# Patient Record
(demographics unavailable — no encounter records)

---

## 2025-04-01 NOTE — HISTORY OF PRESENT ILLNESS
[de-identified] : constipation x 7 days, h/a, stomach pain  [FreeTextEntry6] : Complaining of abdominal pain x 8 days. Has history of constipation, has been getting Miralax x 5 days. Has been doing 1 - 1.5 cap once per day in 24oz of water spread out throughout the day. Having looser stools but not watery. No vomiting or nausea. Not stressed at school, has been doing well, but mom getting called for  from school these past few days. Low energy.

## 2025-04-01 NOTE — HISTORY OF PRESENT ILLNESS
[de-identified] : constipation x 7 days, h/a, stomach pain  [FreeTextEntry6] : Complaining of abdominal pain x 8 days. Has history of constipation, has been getting Miralax x 5 days. Has been doing 1 - 1.5 cap once per day in 24oz of water spread out throughout the day. Having looser stools but not watery. No vomiting or nausea. Not stressed at school, has been doing well, but mom getting called for  from school these past few days. Low energy.

## 2025-04-01 NOTE — DISCUSSION/SUMMARY
[FreeTextEntry1] : Child with left sided abdominal pain and decreased energy. Symptoms are nonspecific at this point, may be prodrome for infection, but location of pain is suspicious for constipation/gas pain. Advised to use 1 cap of Miralax in only 8oz of water or juice, to be finished immediately and not slowly taken throughout the day for full effect. POC UA checked for nonspecific CVA tenderness (questionable reporting of pain/discomfort), negative LE/nitrites but some proteinuria seen; will send for full UA/micro. Can follow up on proteinuria with first morning AM void at well visit at the end of the month. Return precautions given.

## 2025-04-01 NOTE — PHYSICAL EXAM
[Clear] : right tympanic membrane clear [Erythematous Oropharynx] : nonerythematous oropharynx [Clear to Auscultation Bilaterally] : clear to auscultation bilaterally [NL] : soft, nontender, nondistended, normal bowel sounds, no hepatosplenomegaly [Soft] : soft [Tender] : nontender [Distended] : nondistended [Tenderness with Palpation] : tenderness with palpation [FreeTextEntry9] : ?CVA tenderness bilaterally

## 2025-04-29 NOTE — DISCUSSION/SUMMARY
[Normal Growth] : growth [Normal Development] : development [None] : No known medical problems [No Elimination Concerns] : elimination [No Feeding Concerns] : feeding [No Skin Concerns] : skin [Normal Sleep Pattern] : sleep [No Medications] : ~He/She~ is not on any medications [Patient] : patient [FreeTextEntry1] : Vision and Hearing Assessments  Brush teeth twice a day with soft toothbrush. Recommend visit to dentist.  Child needs to ride in a belt-positioning booster seat until  4 feet 9 inches has been reached and are between 8 and 12 years of age Use consistent, positive discipline, and mainly  use accountability over punishments. Read aloud with children before bed  Limit screen time per age appropriate. [employer] for a variety of child rearing matters, including safety  Reviewed options for receiving the appropriate amount of Fluoride potentially through diet, some toothpaste products, or purchased drinks that may unknowingly contain fluoride reviewed. [facility] does not have fluoride in its water supply, there for supplementation with fluoride may be important to promote strong enamel development. However, too much fluoride can cause fluorosis and is a different i.e.significant problem as well. Appropriate brushing for age reviewed, but it should not become a fight. Oral hygiene includes avoidance of triggers for caries such as bottles, appropriate brushing, avoiding sharing pacifiers, discontinuing pacifiers, avoiding sticky sugar based products. Annual Dental visit as directed based on age and dentition.  Multivitamins are not routinely recommended by the [employer] Pediatric[employer]. However, if the diet is not appropriate for age then supplementation is recommended. Options for MVI with and without fluoride reviewed.   Return for well child check in 1 year.  Reviewed medication options for autistic comorbid conditions